# Patient Record
(demographics unavailable — no encounter records)

---

## 2017-04-10 DIAGNOSIS — R10.2 PELVIC PAIN IN FEMALE: ICD-10-CM

## 2017-04-10 DIAGNOSIS — Z87.42 HISTORY OF DYSMENORRHEA: ICD-10-CM

## 2017-04-10 RX ORDER — NORELGESTROMIN AND ETHINYL ESTRADIOL 35; 150 UG/MG; UG/MG
1 PATCH TRANSDERMAL WEEKLY
Qty: 4 PATCH | Refills: 0 | Status: SHIPPED | OUTPATIENT
Start: 2017-04-10 | End: 2018-04-10

## 2017-04-10 NOTE — TELEPHONE ENCOUNTER
Angela desire refill of Orth Evra. Annual exam scheduled.   There is no problem list on file for this patient.    Prior to Admission medications    Medication Sig Start Date End Date Taking? Authorizing Provider   norelgestromin-ethinyl estradiol (ORTHO EVRA) 150-35 mcg/24 hr Place 1 patch onto the skin once a week. 5/16/16 5/16/17  Heather Mcfadden MD

## 2017-04-10 NOTE — TELEPHONE ENCOUNTER
----- Message from Nory Marie MA sent at 4/10/2017 12:40 PM CDT -----  Pt step mother is calling b/c pt needs OCP refill. [facility] in [place]. Pt